# Patient Record
Sex: MALE | Race: BLACK OR AFRICAN AMERICAN | NOT HISPANIC OR LATINO
[De-identification: names, ages, dates, MRNs, and addresses within clinical notes are randomized per-mention and may not be internally consistent; named-entity substitution may affect disease eponyms.]

---

## 2019-02-22 PROBLEM — Z00.00 ENCOUNTER FOR PREVENTIVE HEALTH EXAMINATION: Status: ACTIVE | Noted: 2019-02-22

## 2019-02-27 ENCOUNTER — APPOINTMENT (OUTPATIENT)
Dept: CARDIOTHORACIC SURGERY | Facility: CLINIC | Age: 74
End: 2019-02-27
Payer: MEDICARE

## 2019-02-27 VITALS
OXYGEN SATURATION: 96 % | RESPIRATION RATE: 18 BRPM | HEIGHT: 73 IN | WEIGHT: 174 LBS | BODY MASS INDEX: 23.06 KG/M2 | SYSTOLIC BLOOD PRESSURE: 153 MMHG | HEART RATE: 67 BPM | TEMPERATURE: 97.8 F | DIASTOLIC BLOOD PRESSURE: 99 MMHG

## 2019-02-27 DIAGNOSIS — Z83.3 FAMILY HISTORY OF DIABETES MELLITUS: ICD-10-CM

## 2019-02-27 DIAGNOSIS — I10 ESSENTIAL (PRIMARY) HYPERTENSION: ICD-10-CM

## 2019-02-27 DIAGNOSIS — Z82.49 FAMILY HISTORY OF ISCHEMIC HEART DISEASE AND OTHER DISEASES OF THE CIRCULATORY SYSTEM: ICD-10-CM

## 2019-02-27 DIAGNOSIS — I71.2 THORACIC AORTIC ANEURYSM, W/OUT RUPTURE: ICD-10-CM

## 2019-02-27 PROCEDURE — 99205 OFFICE O/P NEW HI 60 MIN: CPT

## 2019-02-28 PROBLEM — Z82.49 FAMILY HISTORY OF HYPERTENSION: Status: ACTIVE | Noted: 2019-02-28

## 2019-02-28 PROBLEM — I10 HYPERTENSION: Status: ACTIVE | Noted: 2019-02-28

## 2019-02-28 PROBLEM — I71.2 THORACIC AORTIC ANEURYSM, WITHOUT RUPTURE: Status: ACTIVE | Noted: 2019-02-28

## 2019-02-28 PROBLEM — Z83.3 FAMILY HISTORY OF DIABETES MELLITUS: Status: ACTIVE | Noted: 2019-02-28

## 2019-02-28 NOTE — ASSESSMENT
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 \par \par Echocardiogram 2/6/19 revealed:\par LVEF 57%. aortic valve is trileaflet and mildly calcified with normal excursion. aortic root is normal. ascending aorta measures 42mm. mild mitral regurgitation. mild tricuspid regurgitation. trace pulmonary insufficiency. mild aortic insufficiency. \par \par I have reviewed the patient's medical records, diagnostic images during the time of this office consultation and have made the following recommendation. \par \par I have reviewed the indications for surgery which include the following: size greater than 5.0 cm, symptomatic aneurysms, family history of aortic dissection or aneurysm death with a size greater than 4.5 cm, other necessary cardiac procedures such as coronary artery bypass grafting or valvular disorders with an aneurysm greater than 4.5 cm, or connective tissue disorders with an aneurysm size greater than 4.5 cm. \par \par I have discussed activity restrictions with the patient, and would advise exercise at a moderate amount with no heavy lifting over one third of body weight, and avoiding heart rates that exceed 140 beats per minute. In addition, every patient should abstain from tobacco abuse and to avoid all illicit drug use, especially stimulants such as cocaine or methamphetamine. I have also counseled regarding maintaining a healthy heart diet, and losing any excessive weight as this also put undue stress on both the aorta and entire cardiovascular system. First degree family members should be screened for bicuspid valve disease, and ascending aortic aneurysms. \par \par Plan:\par - The patient does not meet size criteria for surgical intervention at the time. Follow up in 6 months with a CTA chest. \par - Blood pressure management. Patient's blood pressure was noted to be elevated at this visit - 153/99, HR 67. I have recommended the patient to monitor his blood pressure closely.  I have asked the patient to follow up with you for medication adjustment. Uncontrolled high blood pressure is the major risk factor for aortic dissection. \par - Follow up with cardiologist and PCP.

## 2019-02-28 NOTE — CONSULT LETTER
[Sincerely,] : Sincerely, [Dear  ___] : Dear ~EDUARDO, [FreeTextEntry2] : Dr. Александр Toscano MD\par [address]\[place] [address] [FreeTextEntry1] : Many thanks for asking me to see this 73 year-old male with an incidental finding of a small aneurysm of the ascending aorta.\par \par As you know, Mr Lemon is an otherwise well gentleman, with past history significant only for hypertension (not currently on any medications).  He recently noticed an intermittent cough; workup thereof included a transthoracic echocardiogram which incidentally demonstrated a 4.2cm ascending aorta above the sino-tubular junction, a trileaflet aortic valve that is functioning appropriately, and preserved left ventricular systolic function.\par \par Mr Lemon denies chest or back pain or any symptoms referable to his small aneurysm.  He has no significant family history of aneurysmal disease or sudden cardiac death.\par \par On exam, Mr Lemon demonstrates no phenotypia typical of a connective tissue disorder.  He is in sinus rhythm.  His blood pressure is somewhat elevated (153/99 mmHg). He has no clinical signs of cardiac failure.\par \par I had a long discussion with Mr Lemon about the natural history of ascending aortic aneurysmal disease.  I told him that, at its current measurement, which, albeit, thus far has been by echocardiography only, he does not meet criteria for surgical intervention, and, ideally, he may never require surgery if his aneurysm size remains stable.  \par \par We discussed the importance of strict blood pressure control; he is going to follow-up with you in this regard within the next few weeks and I advised him that you would most likely start him on an antihypertensive.\par \par I have made arrangements to review Mr Lemon again in approximately 6 months' time, at which point I will arrange for him to have a CT aortogram to better assess the anatomy of his proximal aorta and aortic arch.  In the interim, I advised him to seek medical attention promptly should he experience sudden-onset chest or back pain, even though his risk for an acute aortic event is quite low at present.\par \par I will, of course, keep you updated as regards this gentleman's progress, but please do not hesitate to contact me at any time if you would like to discuss his care any further.\par \par Thank you again for the kind referral.\par \par Kind regards, [FreeTextEntry3] : Bart Anderson MD MSc FRACS\par Department of Cardiovascular & Thoracic Surgery, [facility]\par , Cardiovascular & Thoracic Surgery, [facility]

## 2019-02-28 NOTE — DATA REVIEWED
[FreeTextEntry1] : Echocardiogram 2/6/19 revealed:\par LVEF 57%. aortic valve is trileaflet and mildly calcified with normal excursion. aortic root is normal. ascending aorta measures 42mm. mild mitral regurgitation. mild tricuspid regurgitation. trace pulmonary insufficiency. mild aortic insufficiency.

## 2019-02-28 NOTE — HISTORY OF PRESENT ILLNESS
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 \par \par Echocardiogram 2/6/19 revealed:\par LVEF 57%. aortic valve is trileaflet and mildly calcified with normal excursion. aortic root is normal. ascending aorta measures 42mm. mild mitral regurgitation. mild tricuspid regurgitation. trace pulmonary insufficiency. mild aortic insufficiency. \par \par Patient has been c/o nonproductive cough and excessive mucus production. Otherwise, patient is doing well and denies recent hospitalization, ER visits, or surgeries. He  denies fever, chills, fatigue, headache, blurred vision, dizziness, syncope, chest pain, palpitations, shortness of breath, orthopnea, paroxysmal nocturnal dyspnea, nausea, vomiting, abdominal pain, back pain, BRBPR or swelling to legs.\par

## 2019-02-28 NOTE — PHYSICAL EXAM
[General Appearance - Alert] : alert [General Appearance - In No Acute Distress] : in no acute distress [Sclera] : the sclera and conjunctiva were normal [PERRL With Normal Accommodation] : pupils were equal in size, round, and reactive to light [Extraocular Movements] : extraocular movements were intact [Outer Ear] : the ears and nose were normal in appearance [Oropharynx] : the oropharynx was normal [Neck Appearance] : the appearance of the neck was normal [Neck Cervical Mass (___cm)] : no neck mass was observed [Jugular Venous Distention Increased] : there was no jugular-venous distention [Thyroid Diffuse Enlargement] : the thyroid was not enlarged [Thyroid Nodule] : there were no palpable thyroid nodules [Auscultation Breath Sounds / Voice Sounds] : lungs were clear to auscultation bilaterally [Heart Rate And Rhythm] : heart rate was normal and rhythm regular [Heart Sounds] : normal S1 and S2 [Heart Sounds Gallop] : no gallops [Murmurs] : no murmurs [Heart Sounds Pericardial Friction Rub] : no pericardial rub [Examination Of The Chest] : the chest was normal in appearance [Chest Visual Inspection Thoracic Asymmetry] : no chest asymmetry [Diminished Respiratory Excursion] : normal chest expansion [2+] : left 2+ [No Abnormalities] : the abdominal aorta was not enlarged and no bruit was heard [Bowel Sounds] : normal bowel sounds [Abdomen Soft] : soft [Abdomen Tenderness] : non-tender [Abdomen Mass (___ Cm)] : no abdominal mass palpated [Cervical Lymph Nodes Enlarged Posterior Bilaterally] : posterior cervical [Cervical Lymph Nodes Enlarged Anterior Bilaterally] : anterior cervical [No CVA Tenderness] : no ~M costovertebral angle tenderness [No Spinal Tenderness] : no spinal tenderness [Abnormal Walk] : normal gait [Nail Clubbing] : no clubbing  or cyanosis of the fingernails [Musculoskeletal - Swelling] : no joint swelling seen [Motor Tone] : muscle strength and tone were normal [Skin Color & Pigmentation] : normal skin color and pigmentation [Skin Turgor] : normal skin turgor [] : no rash [Deep Tendon Reflexes (DTR)] : deep tendon reflexes were 2+ and symmetric [Sensation] : the sensory exam was normal to light touch and pinprick [No Focal Deficits] : no focal deficits [Oriented To Time, Place, And Person] : oriented to person, place, and time [Impaired Insight] : insight and judgment were intact [Affect] : the affect was normal [FreeTextEntry1] : deferred

## 2019-09-10 ENCOUNTER — MESSAGE (OUTPATIENT)
Age: 74
End: 2019-09-10

## 2019-09-11 ENCOUNTER — APPOINTMENT (OUTPATIENT)
Dept: CARDIOTHORACIC SURGERY | Facility: CLINIC | Age: 74
End: 2019-09-11

## 2019-10-28 ENCOUNTER — FORM ENCOUNTER (OUTPATIENT)
Age: 74
End: 2019-10-28

## 2019-10-29 ENCOUNTER — APPOINTMENT (OUTPATIENT)
Dept: CARDIOTHORACIC SURGERY | Facility: CLINIC | Age: 74
End: 2019-10-29
Payer: MEDICARE

## 2019-10-29 ENCOUNTER — APPOINTMENT (OUTPATIENT)
Dept: CT IMAGING | Facility: HOSPITAL | Age: 74
End: 2019-10-29

## 2019-10-29 ENCOUNTER — OUTPATIENT (OUTPATIENT)
Dept: OUTPATIENT SERVICES | Facility: HOSPITAL | Age: 74
LOS: 1 days | End: 2019-10-29
Payer: MEDICARE

## 2019-10-29 VITALS
HEART RATE: 59 BPM | SYSTOLIC BLOOD PRESSURE: 163 MMHG | OXYGEN SATURATION: 97 % | RESPIRATION RATE: 18 BRPM | DIASTOLIC BLOOD PRESSURE: 91 MMHG

## 2019-10-29 DIAGNOSIS — I77.810 THORACIC AORTIC ECTASIA: ICD-10-CM

## 2019-10-29 PROCEDURE — 99215 OFFICE O/P EST HI 40 MIN: CPT

## 2019-10-29 PROCEDURE — 71275 CT ANGIOGRAPHY CHEST: CPT

## 2019-10-29 PROCEDURE — 71275 CT ANGIOGRAPHY CHEST: CPT | Mod: 26

## 2019-10-30 PROBLEM — I77.810 ASCENDING AORTA DILATATION: Status: ACTIVE | Noted: 2019-02-28

## 2019-11-20 ENCOUNTER — RECORD ABSTRACTING (OUTPATIENT)
Age: 74
End: 2019-11-20

## 2019-11-20 DIAGNOSIS — N52.9 MALE ERECTILE DYSFUNCTION, UNSPECIFIED: ICD-10-CM

## 2019-11-20 DIAGNOSIS — N40.0 BENIGN PROSTATIC HYPERPLASIA WITHOUT LOWER URINARY TRACT SYMPMS: ICD-10-CM

## 2019-11-20 DIAGNOSIS — R30.0 DYSURIA: ICD-10-CM

## 2019-11-20 DIAGNOSIS — N48.1 BALANITIS: ICD-10-CM

## 2019-11-20 LAB
PSA FREE FLD-MCNC: 18
PSA FREE SERPL-MCNC: 1
PSA SERPL-MCNC: 5.6
TESTOST SERPL-MCNC: 417.1

## 2019-12-31 ENCOUNTER — RX RENEWAL (OUTPATIENT)
Age: 74
End: 2019-12-31

## 2020-04-03 RX ORDER — TAMSULOSIN HYDROCHLORIDE 0.4 MG/1
0.4 CAPSULE ORAL
Qty: 90 | Refills: 0 | Status: ACTIVE | COMMUNITY
Start: 2019-12-31 | End: 1900-01-01

## 2020-06-16 ENCOUNTER — APPOINTMENT (OUTPATIENT)
Dept: UROLOGY | Facility: CLINIC | Age: 75
End: 2020-06-16

## 2020-10-22 ENCOUNTER — NON-APPOINTMENT (OUTPATIENT)
Age: 75
End: 2020-10-22

## 2021-01-12 ENCOUNTER — NON-APPOINTMENT (OUTPATIENT)
Age: 76
End: 2021-01-12

## 2021-01-13 ENCOUNTER — APPOINTMENT (OUTPATIENT)
Dept: CARDIOTHORACIC SURGERY | Facility: HOSPITAL | Age: 76
End: 2021-01-13

## 2021-04-06 ENCOUNTER — NON-APPOINTMENT (OUTPATIENT)
Age: 76
End: 2021-04-06

## 2021-04-07 ENCOUNTER — APPOINTMENT (OUTPATIENT)
Dept: CARDIOTHORACIC SURGERY | Facility: HOSPITAL | Age: 76
End: 2021-04-07

## 2021-06-08 ENCOUNTER — NON-APPOINTMENT (OUTPATIENT)
Age: 76
End: 2021-06-08

## 2021-06-08 NOTE — HISTORY OF PRESENT ILLNESS
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 \par \par Patient is doing well and denies recent hospitalization, ER visits, or surgeries. He  denies fever, chills, fatigue, headache, blurred vision, dizziness, syncope, chest pain, palpitations, shortness of breath, orthopnea, paroxysmal nocturnal dyspnea, nausea, vomiting, abdominal pain, back pain, BRBPR or swelling to legs.XXXX\par \par

## 2021-06-08 NOTE — ASSESSMENT
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 \par \par \par \par Plan:

## 2021-06-08 NOTE — CONSULT LETTER
[FreeTextEntry2] : Dr. Александр Toscano\par [address]\[place] [address]\par [phone] [FreeTextEntry1] : I had the pleasure of seeing your patient, JEOVANNY NGUYEN , in my office today. We take a multidisciplinary team approach to patient care and consider you, the referring physician, an extension of our team. We will maintain an open line of communication with you throughout your patient's treatment course. \par Mr. NGUYEN presents for an initial evaluation and management of thoracic aortic aneurysm. I have reviewed all of his medical records and diagnostic images at the time of his office consultation. I have enclosed a copy for your records. \par \par I have also reviewed the indications for surgery, and used our webpage <<[email or web address]>> to illustrate the aorta and anatomy of the heart. The patient does not meet size criteria for surgical intervention at the time. \par \par Therefore, I have recommended that the patient will require a follow up appointment in _____with _____ to monitor his aortic pathology. My office will assist the patient with his upcoming appointment and I will update you on his progress at that time.\par \par I have discussed with the patient that we will continue to monitor his aortic pathology closely at the [facility] that encompasses the entire health care system and is one of the largest in the nation at this point.\par It is our commitment to provide your patient with the highest quality of advanced therapeutic options. On behalf of the Cardiothoracic Surgery Team, thank you for sending your patient to the [facility] based at [facility]. If there are any questions or concerns, please call me directly at [phone]. \par  [FreeTextEntry3] : Bart Anderson MD, MSc, FRACS\par Attending Surgeon\par Cardiovascular & Thoracic Surgery\par \par Cardiovascular & Thoracic Surgery\par [facility]\par

## 2021-06-08 NOTE — DATA REVIEWED
[FreeTextEntry1] : Echocardiogram 2/6/19 revealed:\par LVEF 57%. aortic valve is trileaflet and mildly calcified with normal excursion. aortic root is normal. ascending aorta measures 42mm. mild mitral regurgitation. mild tricuspid regurgitation. trace pulmonary insufficiency. mild aortic insufficiency. \par \par \par CTA chest 10/29/19\par AORTA: The diameter of the aorta was measured at the following levels: \par Aortic root (sinuses of Valsalva): 3.7 x 3.7 cm \par Ascending aorta: 4.0 x 4.2 cm \par Aortic arch: 2.7 cm \par Proximal descending thoracic aorta: 2.7 x 2.6 cm \par Distal descending thoracic aorta: 2.2 x 2.2 cm \par Suprarenal abdominal aorta: 2.3 x 2.2 cm

## 2021-06-09 ENCOUNTER — APPOINTMENT (OUTPATIENT)
Dept: CARDIOTHORACIC SURGERY | Facility: HOSPITAL | Age: 76
End: 2021-06-09

## 2022-07-08 RX ORDER — NYSTATIN AND TRIAMCINOLONE ACETONIDE 100000; 1 [USP'U]/G; MG/G
OINTMENT TOPICAL
COMMUNITY
Start: 2022-03-10 | End: 2022-09-06

## 2022-07-08 RX ORDER — METOPROLOL SUCCINATE 50 MG/1
TABLET, EXTENDED RELEASE ORAL
COMMUNITY
End: 2022-09-02 | Stop reason: SDUPTHER

## 2022-07-08 RX ORDER — OLMESARTAN MEDOXOMIL AND HYDROCHLOROTHIAZIDE 20/12.5 20; 12.5 MG/1; MG/1
TABLET ORAL
COMMUNITY
Start: 2021-09-30 | End: 2022-09-02 | Stop reason: SDUPTHER

## 2022-07-08 RX ORDER — ATORVASTATIN CALCIUM 20 MG/1
TABLET, FILM COATED ORAL
COMMUNITY
Start: 2021-09-30 | End: 2022-08-04

## 2022-09-22 PROBLEM — E78.5 HYPERLIPIDEMIA: Status: ACTIVE | Noted: 2022-09-22

## 2022-09-22 PROBLEM — N40.0 BENIGN PROSTATIC HYPERPLASIA WITHOUT URINARY OBSTRUCTION: Status: ACTIVE | Noted: 2022-09-22

## 2022-09-22 PROBLEM — E55.9 VITAMIN D DEFICIENCY: Status: ACTIVE | Noted: 2022-09-22

## 2022-09-22 PROBLEM — I10 ESSENTIAL HYPERTENSION: Status: ACTIVE | Noted: 2022-09-22

## 2022-09-22 PROBLEM — R33.9 RETENTION OF URINE: Status: ACTIVE | Noted: 2022-09-22

## 2022-09-22 PROBLEM — N48.1 BALANITIS: Status: ACTIVE | Noted: 2022-09-22

## 2022-09-22 PROBLEM — E04.9 ENLARGED THYROID: Status: ACTIVE | Noted: 2022-09-22
